# Patient Record
Sex: FEMALE | Race: WHITE
[De-identification: names, ages, dates, MRNs, and addresses within clinical notes are randomized per-mention and may not be internally consistent; named-entity substitution may affect disease eponyms.]

---

## 2019-07-05 ENCOUNTER — HOSPITAL ENCOUNTER (EMERGENCY)
Dept: HOSPITAL 72 - EMR | Age: 29
Discharge: HOME | End: 2019-07-05
Payer: COMMERCIAL

## 2019-07-05 VITALS — DIASTOLIC BLOOD PRESSURE: 79 MMHG | SYSTOLIC BLOOD PRESSURE: 138 MMHG

## 2019-07-05 VITALS — BODY MASS INDEX: 36.4 KG/M2 | HEIGHT: 71 IN | WEIGHT: 260 LBS

## 2019-07-05 DIAGNOSIS — Z3A.01: ICD-10-CM

## 2019-07-05 DIAGNOSIS — O20.0: Primary | ICD-10-CM

## 2019-07-05 LAB
ADD MANUAL DIFF: NO
ALBUMIN SERPL-MCNC: 4.3 G/DL (ref 3.4–5)
ALBUMIN/GLOB SERPL: 1.2 {RATIO} (ref 1–2.7)
ALP SERPL-CCNC: 71 U/L (ref 46–116)
ALT SERPL-CCNC: 19 U/L (ref 12–78)
ANION GAP SERPL CALC-SCNC: 11 MMOL/L (ref 5–15)
APPEARANCE UR: CLEAR
APTT PPP: (no result) S
AST SERPL-CCNC: 14 U/L (ref 15–37)
BASOPHILS NFR BLD AUTO: 0.5 % (ref 0–2)
BILIRUB SERPL-MCNC: 0.5 MG/DL (ref 0.2–1)
BUN SERPL-MCNC: 10 MG/DL (ref 7–18)
CALCIUM SERPL-MCNC: 9.4 MG/DL (ref 8.5–10.1)
CHLORIDE SERPL-SCNC: 105 MMOL/L (ref 98–107)
CO2 SERPL-SCNC: 28 MMOL/L (ref 21–32)
CREAT SERPL-MCNC: 1 MG/DL (ref 0.55–1.3)
EOSINOPHIL NFR BLD AUTO: 0.9 % (ref 0–3)
ERYTHROCYTE [DISTWIDTH] IN BLOOD BY AUTOMATED COUNT: 12.5 % (ref 11.6–14.8)
GLOBULIN SER-MCNC: 3.5 G/DL
GLUCOSE UR STRIP-MCNC: NEGATIVE MG/DL
HCT VFR BLD CALC: 43.2 % (ref 37–47)
HGB BLD-MCNC: 13.8 G/DL (ref 12–16)
KETONES UR QL STRIP: NEGATIVE
LEUKOCYTE ESTERASE UR QL STRIP: NEGATIVE
LYMPHOCYTES NFR BLD AUTO: 22 % (ref 20–45)
MCV RBC AUTO: 88 FL (ref 80–99)
MONOCYTES NFR BLD AUTO: 4.9 % (ref 1–10)
NEUTROPHILS NFR BLD AUTO: 71.7 % (ref 45–75)
NITRITE UR QL STRIP: NEGATIVE
PH UR STRIP: 7 [PH] (ref 4.5–8)
PLATELET # BLD: 290 K/UL (ref 150–450)
POTASSIUM SERPL-SCNC: 3.7 MMOL/L (ref 3.5–5.1)
PROT UR QL STRIP: NEGATIVE
RBC # BLD AUTO: 4.93 M/UL (ref 4.2–5.4)
SODIUM SERPL-SCNC: 144 MMOL/L (ref 136–145)
SP GR UR STRIP: 1 (ref 1–1.03)
UROBILINOGEN UR-MCNC: NORMAL MG/DL (ref 0–1)
WBC # BLD AUTO: 9.7 K/UL (ref 4.8–10.8)

## 2019-07-05 PROCEDURE — 84702 CHORIONIC GONADOTROPIN TEST: CPT

## 2019-07-05 PROCEDURE — 36415 COLL VENOUS BLD VENIPUNCTURE: CPT

## 2019-07-05 PROCEDURE — 85025 COMPLETE CBC W/AUTO DIFF WBC: CPT

## 2019-07-05 PROCEDURE — 99284 EMERGENCY DEPT VISIT MOD MDM: CPT

## 2019-07-05 PROCEDURE — 86900 BLOOD TYPING SEROLOGIC ABO: CPT

## 2019-07-05 PROCEDURE — 76801 OB US < 14 WKS SINGLE FETUS: CPT

## 2019-07-05 PROCEDURE — 81003 URINALYSIS AUTO W/O SCOPE: CPT

## 2019-07-05 PROCEDURE — 96360 HYDRATION IV INFUSION INIT: CPT

## 2019-07-05 PROCEDURE — 80053 COMPREHEN METABOLIC PANEL: CPT

## 2019-07-05 PROCEDURE — 86901 BLOOD TYPING SEROLOGIC RH(D): CPT

## 2019-07-05 NOTE — NUR
ED Nurse Note:



Patient walked into ED c/o vaginal bleeding that started about 1 week ago. 
patient denies any cramping pain. patient is alert awake x4 ambulatory, 
breathing unlabored and even, skin is warm to touch.

## 2019-07-05 NOTE — EMERGENCY ROOM REPORT
History of Present Illness


General


Chief Complaint:  Pregnancy Complications


Source:  Patient





Present Illness


HPI


This patient is G2, P1 at 7 weeks estimated gestational age by dates.  She 

states that about 1 hour prior to arrival she developed some light vaginal 

bleeding.  She has had some slight cramping but overall has had no pain.  She 

states she is only on prenatal vitamins.  She denies trauma.  She denies recent 

illness.  She denies fever chills.  She denies dysuria or hematuria.  She has 

no other complaints.


Allergies:  


Coded Allergies:  


     No Known Allergies (Unverified , 19)





Patient History


Past Medical History:  none


Past Surgical History:  none


Social History:  Denies: smoking, alcohol use, drug use


Last Menstrual Period:  19


Pregnant Now:  Yes


Reviewed Nursing Documentation:  PMH: Agreed; PSxH: Agreed





Nursing Documentation-PMH


Past Medical History:  No Stated History





Review of Systems


All Other Systems:  negative except mentioned in HPI





Physical Exam





Vital Signs








  Date Time  Temp Pulse Resp B/P (MAP) Pulse Ox O2 Delivery O2 Flow Rate FiO2


 


19 14:36 98.4 91 18 138/79 (98) 98 Room Air  








Sp02 EP Interpretation:  reviewed, normal


General Appearance:  no apparent distress, alert, GCS 15, non-toxic


Head:  normocephalic, atraumatic


Eyes:  bilateral eye normal inspection, bilateral eye PERRL


ENT:  hearing grossly normal, normal pharynx, no angioedema, normal voice


Neck:  full range of motion, supple/symm/no masses


Respiratory:  chest non-tender, lungs clear, normal breath sounds, no 

respiratory distress, no retraction, no accessory muscle use, speaking full 

sentences


Cardiovascular #1:  regular rate, rhythm, no edema


Gastrointestinal:  normal bowel sounds, non tender, soft, non-distended, no 

guarding, no rebound


Rectal:  deferred


Genitourinary:  deferred


Musculoskeletal:  back normal, gait/station normal, normal range of motion, non-

tender


Neurologic:  alert, oriented x3, responsive, motor strength/tone normal, 

sensory intact, speech normal


Psychiatric:  judgement/insight normal, memory normal, mood/affect normal, no 

suicidal/homicidal ideation





Medical Decision Making


Diagnostic Impression:  


 Primary Impression:  


 First trimester bleeding


 Additional Impression:  


 Threatened miscarriage


ER Course


This patient presents with first trimester bleeding.  She does have a 5-week +6-

day live intrauterine pregnancy with a fetal heartbeat.  Patient does have 

significant bleeding and therefore this is a threatened .  Based on the 

patient's blood type O+, she does not require RhoGam.  The patient was 

instructed to follow-up closely with her OB physician.  She is given close 

return precautions and follow-up instructions.





Laboratory Tests








Test


  19


14:46 19


14:52


 


Urine Color Pale yellow   


 


Urine Appearance Clear   


 


Urine pH 7 (4.5-8.0)   


 


Urine Specific Gravity


  1.005


(1.005-1.035) 


 


 


Urine Protein


  Negative


(NEGATIVE) 


 


 


Urine Glucose (UA)


  Negative


(NEGATIVE) 


 


 


Urine Ketones


  Negative


(NEGATIVE) 


 


 


Urine Blood


  5+ (NEGATIVE)


H 


 


 


Urine Nitrite


  Negative


(NEGATIVE) 


 


 


Urine Bilirubin


  Negative


(NEGATIVE) 


 


 


Urine Urobilinogen


  Normal MG/DL


(0.0-1.0) 


 


 


Urine Leukocyte Esterase


  Negative


(NEGATIVE) 


 


 


Urine RBC


  10-15 /HPF (0


- 2)  H 


 


 


Urine WBC


  0 /HPF (0 - 2)


  


 


 


Urine Squamous Epithelial


Cells Occasional


/LPF 


 


 


Urine Bacteria


  None /HPF


(NONE) 


 


 


White Blood Count


  


  9.7 K/UL


(4.8-10.8)


 


Red Blood Count


  


  4.93 M/UL


(4.20-5.40)


 


Hemoglobin


  


  13.8 G/DL


(12.0-16.0)


 


Hematocrit


  


  43.2 %


(37.0-47.0)


 


Mean Corpuscular Volume  88 FL (80-99)  


 


Mean Corpuscular Hemoglobin


  


  28.0 PG


(27.0-31.0)


 


Mean Corpuscular Hemoglobin


Concent 


  32.0 G/DL


(32.0-36.0)


 


Red Cell Distribution Width


  


  12.5 %


(11.6-14.8)


 


Platelet Count


  


  290 K/UL


(150-450)


 


Mean Platelet Volume


  


  5.7 FL


(6.5-10.1)  L


 


Neutrophils (%) (Auto)


  


  71.7 %


(45.0-75.0)


 


Lymphocytes (%) (Auto)


  


  22.0 %


(20.0-45.0)


 


Monocytes (%) (Auto)


  


  4.9 %


(1.0-10.0)


 


Eosinophils (%) (Auto)


  


  0.9 %


(0.0-3.0)


 


Basophils (%) (Auto)


  


  0.5 %


(0.0-2.0)


 


Sodium Level


  


  144 MMOL/L


(136-145)


 


Potassium Level


  


  3.7 MMOL/L


(3.5-5.1)


 


Chloride Level


  


  105 MMOL/L


()


 


Carbon Dioxide Level


  


  28 MMOL/L


(21-32)


 


Anion Gap


  


  11 mmol/L


(5-15)


 


Blood Urea Nitrogen


  


  10 mg/dL


(7-18)


 


Creatinine


  


  1.0 MG/DL


(0.55-1.30)


 


Estimate Glomerular


Filtration Rate 


  > 60 mL/min


(>60)


 


Glucose Level


  


  102 MG/DL


()


 


Calcium Level


  


  9.4 MG/DL


(8.5-10.1)


 


Total Bilirubin


  


  0.5 MG/DL


(0.2-1.0)


 


Aspartate Amino Transferase


(AST) 


  14 U/L (15-37)


L


 


Alanine Aminotransferase (ALT)


  


  19 U/L (12-78)


 


 


Alkaline Phosphatase


  


  71 U/L


()


 


Total Protein


  


  7.8 G/DL


(6.4-8.2)


 


Albumin


  


  4.3 G/DL


(3.4-5.0)


 


Globulin  3.5 g/dL  


 


Albumin/Globulin Ratio  1.2 (1.0-2.7)  


 


Human Chorionic Gonadotropin,


Quant 


  2267 mIU/mL


(1-6)  H








Laboratory Tests








Test


  19


14:46 19


14:52


 


Urine Color Pale yellow   


 


Urine Appearance Clear   


 


Urine pH 7 (4.5-8.0)   


 


Urine Specific Gravity


  1.005


(1.005-1.035) 


 


 


Urine Protein


  Negative


(NEGATIVE) 


 


 


Urine Glucose (UA)


  Negative


(NEGATIVE) 


 


 


Urine Ketones


  Negative


(NEGATIVE) 


 


 


Urine Blood


  5+ (NEGATIVE)


H 


 


 


Urine Nitrite


  Negative


(NEGATIVE) 


 


 


Urine Bilirubin


  Negative


(NEGATIVE) 


 


 


Urine Urobilinogen


  Normal MG/DL


(0.0-1.0) 


 


 


Urine Leukocyte Esterase


  Negative


(NEGATIVE) 


 


 


Urine RBC


  10-15 /HPF (0


- 2)  H 


 


 


Urine WBC


  0 /HPF (0 - 2)


  


 


 


Urine Squamous Epithelial


Cells Occasional


/LPF 


 


 


Urine Bacteria


  None /HPF


(NONE) 


 


 


White Blood Count


  


  9.7 K/UL


(4.8-10.8)


 


Red Blood Count


  


  4.93 M/UL


(4.20-5.40)


 


Hemoglobin


  


  13.8 G/DL


(12.0-16.0)


 


Hematocrit


  


  43.2 %


(37.0-47.0)


 


Mean Corpuscular Volume  88 FL (80-99)  


 


Mean Corpuscular Hemoglobin


  


  28.0 PG


(27.0-31.0)


 


Mean Corpuscular Hemoglobin


Concent 


  32.0 G/DL


(32.0-36.0)


 


Red Cell Distribution Width


  


  12.5 %


(11.6-14.8)


 


Platelet Count


  


  290 K/UL


(150-450)


 


Mean Platelet Volume


  


  5.7 FL


(6.5-10.1)  L


 


Neutrophils (%) (Auto)


  


  71.7 %


(45.0-75.0)


 


Lymphocytes (%) (Auto)


  


  22.0 %


(20.0-45.0)


 


Monocytes (%) (Auto)


  


  4.9 %


(1.0-10.0)


 


Eosinophils (%) (Auto)


  


  0.9 %


(0.0-3.0)


 


Basophils (%) (Auto)


  


  0.5 %


(0.0-2.0)


 


Sodium Level


  


  144 MMOL/L


(136-145)


 


Potassium Level


  


  3.7 MMOL/L


(3.5-5.1)


 


Chloride Level


  


  105 MMOL/L


()


 


Carbon Dioxide Level


  


  28 MMOL/L


(21-32)


 


Anion Gap


  


  11 mmol/L


(5-15)


 


Blood Urea Nitrogen


  


  10 mg/dL


(7-18)


 


Creatinine


  


  1.0 MG/DL


(0.55-1.30)


 


Estimate Glomerular


Filtration Rate 


  > 60 mL/min


(>60)


 


Glucose Level


  


  102 MG/DL


()


 


Calcium Level


  


  9.4 MG/DL


(8.5-10.1)


 


Total Bilirubin


  


  0.5 MG/DL


(0.2-1.0)


 


Aspartate Amino Transferase


(AST) 


  14 U/L (15-37)


L


 


Alanine Aminotransferase (ALT)


  


  19 U/L (12-78)


 


 


Alkaline Phosphatase


  


  71 U/L


()


 


Total Protein


  


  7.8 G/DL


(6.4-8.2)


 


Albumin


  


  4.3 G/DL


(3.4-5.0)


 


Globulin  3.5 g/dL  


 


Albumin/Globulin Ratio  1.2 (1.0-2.7)  


 


Human Chorionic Gonadotropin,


Quant 


  2267 mIU/mL


(1-6)  H








CT/MRI/US Diagnostic Results


CT/MRI/US Diagnostic Results :  


   Imaging Test Ordered:  Pelvic US


   Impression


Single viable intrauterine pregnancy 5 weeks 6 days.





Last Vital Signs








  Date Time  Temp Pulse Resp B/P (MAP) Pulse Ox O2 Delivery O2 Flow Rate FiO2


 


19 14:36 98.4 91 18 138/79 (98) 98 Room Air  








Status:  improved


Disposition:  HOME, SELF-CARE


Condition:  Improved











Naz Robin DO 2019 15:04

## 2019-07-05 NOTE — DIAGNOSTIC IMAGING REPORT
Indication: Vaginal bleeding

 

Technique: Grayscale and duplex Doppler imaging of the pelvis performed utilizing a

transabdominal scan and endovaginal scan.

 

Comparison: None

 

Findings:

 

Single living IUP demonstrated 5 weeks 6 days by crown-rump length. Fetal heart tones

demonstrated. Trace free fluid in the cul-de-sac noted. Both ovaries are demonstrated

and the show dopplerable blood flow. The right ovary measures 2.6 x 1.6 x 2.6 cm.

Left ovary 3 x 2.1 x 2.4 cm. Uterus is retroverted.

 

IMPRESSION:

 

Single viable intrauterine pregnancy 5 weeks 6 days.